# Patient Record
Sex: FEMALE | Race: WHITE
[De-identification: names, ages, dates, MRNs, and addresses within clinical notes are randomized per-mention and may not be internally consistent; named-entity substitution may affect disease eponyms.]

---

## 2019-10-04 ENCOUNTER — HOSPITAL ENCOUNTER (INPATIENT)
Dept: HOSPITAL 26 - MED | Age: 49
LOS: 1 days | Discharge: HOME | DRG: 637 | End: 2019-10-05
Attending: FAMILY MEDICINE | Admitting: FAMILY MEDICINE
Payer: COMMERCIAL

## 2019-10-04 VITALS — BODY MASS INDEX: 29.95 KG/M2 | WEIGHT: 169 LBS | HEIGHT: 63 IN

## 2019-10-04 VITALS — SYSTOLIC BLOOD PRESSURE: 119 MMHG | DIASTOLIC BLOOD PRESSURE: 66 MMHG

## 2019-10-04 VITALS — SYSTOLIC BLOOD PRESSURE: 120 MMHG | DIASTOLIC BLOOD PRESSURE: 62 MMHG

## 2019-10-04 VITALS — DIASTOLIC BLOOD PRESSURE: 69 MMHG | SYSTOLIC BLOOD PRESSURE: 125 MMHG

## 2019-10-04 VITALS — DIASTOLIC BLOOD PRESSURE: 73 MMHG | SYSTOLIC BLOOD PRESSURE: 153 MMHG

## 2019-10-04 VITALS — DIASTOLIC BLOOD PRESSURE: 69 MMHG | SYSTOLIC BLOOD PRESSURE: 116 MMHG

## 2019-10-04 DIAGNOSIS — Z79.4: ICD-10-CM

## 2019-10-04 DIAGNOSIS — G92: ICD-10-CM

## 2019-10-04 DIAGNOSIS — I10: ICD-10-CM

## 2019-10-04 DIAGNOSIS — T68.XXXA: ICD-10-CM

## 2019-10-04 DIAGNOSIS — E78.00: ICD-10-CM
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
## 2019-10-04 DIAGNOSIS — E43: ICD-10-CM

## 2019-10-04 DIAGNOSIS — Z83.3: ICD-10-CM

## 2019-10-04 DIAGNOSIS — E78.5: ICD-10-CM

## 2019-10-04 DIAGNOSIS — E11.649: Primary | ICD-10-CM

## 2019-10-04 LAB
ALBUMIN FLD-MCNC: 2.8 G/DL (ref 3.4–5)
ALBUMIN FLD-MCNC: 3.4 G/DL (ref 3.4–5)
AMYLASE SERPL-CCNC: 86 U/L (ref 25–115)
ANION GAP SERPL CALCULATED.3IONS-SCNC: 16.2 MMOL/L (ref 8–16)
APPEARANCE UR: CLEAR
AST SERPL-CCNC: 17 U/L (ref 15–37)
BARBITURATES UR QL SCN: (no result) NG/ML
BASOPHILS # BLD AUTO: 0 K/UL (ref 0–0.22)
BASOPHILS NFR BLD AUTO: 0.4 % (ref 0–2)
BENZODIAZ UR QL SCN: (no result) NG/ML
BILIRUB SERPL-MCNC: 0.2 MG/DL (ref 0–1)
BILIRUB UR QL STRIP: NEGATIVE
BUN SERPL-MCNC: 29 MG/DL (ref 7–18)
BZE UR QL SCN: (no result) NG/ML
CANNABINOIDS UR QL SCN: (no result) NG/ML
CHLORIDE SERPL-SCNC: 106 MMOL/L (ref 98–107)
CHOLEST/HDLC SERPL: 4.2 {RATIO} (ref 1–4.5)
CO2 SERPL-SCNC: 22.9 MMOL/L (ref 21–32)
COLOR UR: YELLOW
CREAT SERPL-MCNC: 1.1 MG/DL (ref 0.6–1.3)
EOSINOPHIL # BLD AUTO: 0.1 K/UL (ref 0–0.4)
EOSINOPHIL NFR BLD AUTO: 1.8 % (ref 0–4)
ERYTHROCYTE [DISTWIDTH] IN BLOOD BY AUTOMATED COUNT: 14 % (ref 11.6–13.7)
FINE GRAN CASTS URNS QL MICRO: (no result) /LPF
GFR SERPL CREATININE-BSD FRML MDRD: 68 ML/MIN (ref 90–?)
GLUCOSE SERPL-MCNC: 168 MG/DL (ref 74–106)
GLUCOSE UR STRIP-MCNC: (no result) MG/DL
HCT VFR BLD AUTO: 39 % (ref 36–48)
HDLC SERPL-MCNC: 37 MG/DL (ref 40–60)
HGB BLD-MCNC: 12.9 G/DL (ref 12–16)
HGB UR QL STRIP: NEGATIVE
HYALINE CASTS URNS QL MICRO: (no result) /LPF
LDH SERPL-CCNC: 124 U/L (ref 81–234)
LDLC SERPL CALC-MCNC: 111 MG/DL (ref 60–100)
LEUKOCYTE ESTERASE UR QL STRIP: NEGATIVE
LIPASE SERPL-CCNC: 281 U/L (ref 73–393)
LYMPHOCYTES # BLD AUTO: 1.4 K/UL (ref 2.5–16.5)
LYMPHOCYTES NFR BLD AUTO: 26.9 % (ref 20.5–51.1)
MAGNESIUM SERPL-MCNC: 1.5 MG/DL (ref 1.8–2.4)
MCH RBC QN AUTO: 32 PG (ref 27–31)
MCHC RBC AUTO-ENTMCNC: 33 G/DL (ref 33–37)
MCV RBC AUTO: 95.7 FL (ref 80–94)
MONOCYTES # BLD AUTO: 0.3 K/UL (ref 0.8–1)
MONOCYTES NFR BLD AUTO: 6.2 % (ref 1.7–9.3)
NEUTROPHILS # BLD AUTO: 3.5 K/UL (ref 1.8–7.7)
NEUTROPHILS NFR BLD AUTO: 64.7 % (ref 42.2–75.2)
NITRITE UR QL STRIP: NEGATIVE
OPIATES UR QL SCN: (no result) NG/ML
PCP UR QL SCN: (no result) NG/ML
PH UR STRIP: 5.5 [PH] (ref 5–9)
PHOSPHATE SERPL-MCNC: 4.2 MG/DL (ref 2.5–4.9)
PLATELET # BLD AUTO: 295 K/UL (ref 140–450)
POTASSIUM SERPL-SCNC: 4.1 MMOL/L (ref 3.5–5.1)
PROTHROMBIN TIME: 9.9 SECS (ref 10.8–13.4)
RBC # BLD AUTO: 4.07 MIL/UL (ref 4.2–5.4)
RBC #/AREA URNS HPF: (no result) /HPF (ref 0–5)
SODIUM SERPL-SCNC: 141 MMOL/L (ref 136–145)
T4 FREE SERPL-MCNC: 0.96 NG/DL (ref 0.76–1.46)
TRIGL SERPL-MCNC: 44 MG/DL (ref 30–150)
TSH SERPL DL<=0.05 MIU/L-ACNC: 1.3 UIU/ML (ref 0.34–3.74)
WBC # BLD AUTO: 5.4 K/UL (ref 4.8–10.8)
WBC,URINE: (no result) /HPF (ref 0–5)

## 2019-10-04 PROCEDURE — G0482 DRUG TEST DEF 15-21 CLASSES: HCPCS

## 2019-10-04 RX ADMIN — SODIUM CHLORIDE SCH MLS/HR: 9 INJECTION, SOLUTION INTRAVENOUS at 23:49

## 2019-10-04 RX ADMIN — Medication SCH DEV: at 13:30

## 2019-10-04 RX ADMIN — DEXTROSE AND SODIUM CHLORIDE SCH MLS/HR: 5; .9 INJECTION, SOLUTION INTRAVENOUS at 20:49

## 2019-10-04 RX ADMIN — SODIUM CHLORIDE SCH MLS/HR: 9 INJECTION, SOLUTION INTRAVENOUS at 20:14

## 2019-10-04 RX ADMIN — INSULIN LISPRO PRN UNITS: 100 INJECTION, SOLUTION INTRAVENOUS; SUBCUTANEOUS at 20:42

## 2019-10-04 RX ADMIN — Medication SCH DEV: at 20:25

## 2019-10-04 RX ADMIN — INSULIN LISPRO PRN UNITS: 100 INJECTION, SOLUTION INTRAVENOUS; SUBCUTANEOUS at 16:21

## 2019-10-04 RX ADMIN — DEXTROSE AND SODIUM CHLORIDE SCH MLS/HR: 5; .9 INJECTION, SOLUTION INTRAVENOUS at 13:32

## 2019-10-04 RX ADMIN — Medication SCH DEV: at 16:16

## 2019-10-04 NOTE — NUR
-------------------------------------------------------------------------------

            *** Note undone in ED - 10/04/19 at 1229 by [email or web address] ***            

-------------------------------------------------------------------------------

PER ICU RN, WE CAN'T TAKE PT TO ICU UNTIL CHARGE RN IS BACK FROM HER CLASS

## 2019-10-04 NOTE — NUR
PT STOOD UP AND WAS ABLE TO GET INTO WHEELCHAIR TO GO TO RESTROOM. PT REPORTS 
FEELING "MUCH BETTER" AT THIS TIME. PT PROVIDED URINE SAMPLE

## 2019-10-04 NOTE — NUR
PATIENT HAS BEEN SCREENED AND CATEGORIZED AS HIGH NUTRITION RISK. PATIENT WILL BE SEEN 
WITHIN 1-2 DAYS OF ADMISSION.



10/05/19-10/06/19



MARIS [address]

## 2019-10-04 NOTE — NUR
SPOKE WITH DR. MARIN REGARDING PATIENT'S HIGH BGL .  DOCTOR ORDERED ME TO D/C THE 
D5NS IVF AND REPLACE IT WITH NS AT 80ML/H.  WILL CARRY OUT ORDERS AS PRESCRIBED

## 2019-10-04 NOTE — NUR
RECEIVED PT FROM ICU NURSE, SUMMER. PT CAME IN WHEELCHAIR AND AMBULATE TO [facility] BED. NO S/S 
SOB NOTED ON ROOM AIR. IV SITE ON RIGHT HAND 20G, INTACT, PATENT, AND ASYMPTOMATIC. HANG NS 
1,000ML @ 80MLS/HR AS MD ORDERED. SKIN INTACT, WARM AND DRY TO TOUCH. BOARD UPDATED, POC 
DISCUSSED WITH PT. PT VERBALIZED UNDERSTANDING. BED IN LOW POSITION, CALL LIGHT WITHIN 
REACH.

## 2019-10-04 NOTE — NUR
PT DONE WITH DINNER, %. USED BEDSIDE COMMODE. VOIDED 500 ML CLEAR YELLOW URINE. 
SITTING UP ON BEDSIDE CHAIR WATCHING TV AT THIS TIME. TEMP 98.3F. VSS. NO S/SX OF DISTRESS 
AT THIS TIME. SAFETY MEASURES IN PLACE. WILL CONTINUE TO MONITOR.

## 2019-10-04 NOTE — NUR
DINNER TRAY PROVIDED. PT ABLE TO EAT INDEPENDENTLY. NEEDS WELL ATTENDED. VSS. NO DISTRESS 
NOTED. SAFETY PRECAUTIONS IN PLACE.

## 2019-10-04 NOTE — NUR
PT ADMITTED TO ICU FROM ER, ARRIVED VIA GURNEY X 2 ASSISTS. RECEIVED BEDSIDE REPORT FROM 
CONRAD BELTRAN. PT IS AAOX4. ABLE TO MAKE NEEDS KNOWN AND FOLLOWS COMMANDS. TEMP 97.7F. DENIES 
PAIN. NORMAL SINUS RHYTHM ON MONITOR. S1 +S2 HEARD. PT IS ON ROOM AIR, SPO2 100%. NO SOB OR 
RESPIRATORY DISTRESS NOTED. LUNGS SOUND CLEAR THROUGHOUT. BREATHING EVEN AND UNLABORED. 
ABDOMEN SOFT, ROUND, NONTENDER W/ ACTIVE BOWEL SOUNDS X 4 QUADRANTS. PERIPHERAL IV G20 TO 
RIGHT HAND ASYMPTOMATIC, PATENT AND INTACT, RUNNING IV FLUID D5NS  ML/HR. PT IS 
CONTINENT BOWEL AND BLADDER. NO BLADDER DISTENTION NOTED. SKIN IS INTACT, MOIST ON UPPER 
BODY BUT WARM TO TOUCH. PULSES PALPABLE TO ALL EXTREMITIES, CAP REFILL < 2 SEC. SCDS IN 
PLACE FOR VTE PROPHYLAXIS. HOB AT 30 DEGREES, BED IN LOWEST POSITION LOCKED. SIDE RAILS UP 
X2. CALL LIGHT WITHIN REACH. NO SIGNS OF ACUTE DISTRESS NOTED AT THIS TIME. WILL CONTINUE TO 
MONITOR.

## 2019-10-04 NOTE — NUR
BIBA AFTER BEING FOUND NON RESPONSIVE BY DAUGHTER THIS MORNING. ON SCENE, FSBS 
IN 30's, AMP OF D50 GIVEN AND D10 FLUIDS RAN PRIOR TO ARRIVAL TO ED. UPON 
ARRIVAL, FSBS 148, PT IS AWAKE AND ALERT, SLIGHTLY CONFUSED/SLOW TO RESPOND. 
AFTER EXPLAINING WHAT HAPPENED AND WHERE SHE IS, PT WAS ABLE TO COMPREHEND 
SITUATION AND BECAME A&O X3 (NAME, PLACE, BDAY). PT IS C/O L CALF PAIN, DOESNT 
KNOW IF SHE INJURED HERSELF AT ALL. PT IS HYPOTHERMIC UPON ARRIVAL, 89.9 RECTAL 
TEMP. ALL WET CLOTHING REMOVED AND BEAR HUGGER AND MULTIPLE BLANKETS APPLIED IN 
ATTEMPT TO REWARM PT. BED IN LOW POSITION, SIDE RAIL UP X2 FOR PT SAFTE. PT 
PLACED ON BEDSIDE CARDIAC MONITOR AT THIS TIME. VSS

## 2019-10-04 NOTE — NUR
PER IRENE, ICU RN - CHARGE NURSE IS NOT BACK YET, WE CANNOT TAKE PT TO ICU AT 
THIS TIME. IRENE WILL SEND PT TRAY OVER HERE TO ER.

## 2019-10-04 NOTE — NUR
RECEIVED CHANGE OF SHIFT REPORT FROM AM NURSE.  PATIENT IS ALERT, AWAKE, AND ORIENTED.  FULL 
CODE AND NO KNOWN ALLERGIES.  SKIN IS WARM, DRY AND INTACT WITH NO EDEMA AND NO FEVER.  
RESPIRATIONS ARE UNLABORED, REGULAR, AND LUNG SOUNDS ARE CLEAR THROUGHOUT WITH NO COUGH 
PRESENT.  NSR ON CARDIAC MONITOR WITH S1 AND S2 SOUND AUSCULTATED. PULSES IN UPPER AND LOWER 
EXTREMITIES ARE 2+ BILATERALLY AND MOTOR FUNCTION IS NORMAL IN UPPER AND LOWER EXTREMITIES. 
PERRLA IS PRESENT WITH PUPILS 3MM BILATERALLY.  PATIENT USES BEDSIDE COMMODE, WITH NO URINE 
OR FECES PRESENT AT THIS TIME.  LAST BOWEL MOVEMENT WAS 10/03/19.  RIGHT HAND 20 GAUGE 
PATENT IV IS PRESENT WITH SITE/DRESSING DRY AND INTACT.  D5NS IS CURRENTLY INFUSING AT THE 
PRESCRIBED 100ML/H.  PATIENT DOES NOT NEED ANYTHING AT THIS TIME, AND CALL LIGHT LEFT WITHIN 
REACH AS PATIENT IS SITTING COMFORTABLY IN THE CHAIR WATCHING TV.

## 2019-10-04 NOTE — NUR
Transferred patient in wheelchair to Telemetry unit per orders.  Gave transfer report to 
Telemetry Nurse Benjamín.  Patient is currently awake, alert, and oriented x4.  Patient has no 
complaints at this time and vital signs are stable and within normal limits.

## 2019-10-04 NOTE — NUR
PT AWAKE AND CONVERSATING WITH HER SON AT BEDSIDE, STATES SHE IS STILL FEELING 
BETTER AT THIS TIME.

## 2019-10-04 NOTE — NUR
Patient will be admitted to care of DR. HARRIS. Admited to ICU.  Will go to room 
3. Belongings list completed.  Report to CONRAD GODOY.

## 2019-10-04 NOTE — NUR
-------------------------------------------------------------------------------

            *** Note undone in EDM - 10/04/19 at 0956 by [email or web address] ***            

-------------------------------------------------------------------------------

BIBA AFTER BEING FOUND NON RESPONSIVE BY DAUGHTER THIS MORNING. ON SCENE, FSBS 
IN 30's, AMP OF D50 GIVEN AND D10 FLUIDS RAN PRIOR TO ARRIVAL TO ED. UPON 
ARRIVAL, FSBS 148, PT IS AWAKE AND ALERT, SLIGHTLY CONFUSED/SLOW TO RESPOND. 
AFTER EXPLAINING WHAT HAPPENED AND WHERE SHE IS, PT WAS ABLE TO COMPREHEND 
SITUATION AND BECAME A&O X3 (NAME, PLACE, BDAY). PT IS C/O L CALF PAIN, DOESNT 
KNOW IF SHE INJURED HERSELF AT ALL. PT IS HYPOTHERMIC UPON ARRIVAL, 98.9 RECTAL 
TEMP. ALL WET CLOTHING REMOVED AND BEAR HUGGER AND MULTIPLE BLANKETS APPLIED IN 
ATTEMPT TO REWARM PT. BED IN LOW POSITION, SIDE RAIL UP X2 FOR PT SAFTE. PT 
PLACED ON BEDSIDE CARDIAC MONITOR AT THIS TIME. VSS

## 2019-10-04 NOTE — NUR
FAMILY AT BEDSIDE. PT IS AAOX4. VSS. TEMP 98.6 F PER TEMPORAL ARTERY SCAN. DENIES PAIN. NO 
SIGNS OF DISTRESS NOTED AT THIS TIME. WILL CONTINUE TO MONITOR.

## 2019-10-05 VITALS — SYSTOLIC BLOOD PRESSURE: 125 MMHG | DIASTOLIC BLOOD PRESSURE: 69 MMHG

## 2019-10-05 VITALS — SYSTOLIC BLOOD PRESSURE: 130 MMHG | DIASTOLIC BLOOD PRESSURE: 69 MMHG

## 2019-10-05 VITALS — SYSTOLIC BLOOD PRESSURE: 133 MMHG | DIASTOLIC BLOOD PRESSURE: 73 MMHG

## 2019-10-05 LAB
ANION GAP SERPL CALCULATED.3IONS-SCNC: 12.4 MMOL/L (ref 8–16)
BASOPHILS # BLD AUTO: 0 K/UL (ref 0–0.22)
BASOPHILS NFR BLD AUTO: 0.3 % (ref 0–2)
BUN SERPL-MCNC: 16 MG/DL (ref 7–18)
CHLORIDE SERPL-SCNC: 110 MMOL/L (ref 98–107)
CO2 SERPL-SCNC: 23.9 MMOL/L (ref 21–32)
CREAT SERPL-MCNC: 0.8 MG/DL (ref 0.6–1.3)
EOSINOPHIL # BLD AUTO: 0.2 K/UL (ref 0–0.4)
EOSINOPHIL NFR BLD AUTO: 3.2 % (ref 0–4)
ERYTHROCYTE [DISTWIDTH] IN BLOOD BY AUTOMATED COUNT: 14 % (ref 11.6–13.7)
GFR SERPL CREATININE-BSD FRML MDRD: 98 ML/MIN (ref 90–?)
GLUCOSE SERPL-MCNC: 104 MG/DL (ref 74–106)
HCT VFR BLD AUTO: 34.4 % (ref 36–48)
HGB BLD-MCNC: 11.2 G/DL (ref 12–16)
LYMPHOCYTES # BLD AUTO: 2.5 K/UL (ref 2.5–16.5)
LYMPHOCYTES NFR BLD AUTO: 35.7 % (ref 20.5–51.1)
MAGNESIUM SERPL-MCNC: 1.8 MG/DL (ref 1.8–2.4)
MCH RBC QN AUTO: 31 PG (ref 27–31)
MCHC RBC AUTO-ENTMCNC: 33 G/DL (ref 33–37)
MCV RBC AUTO: 95.9 FL (ref 80–94)
MONOCYTES # BLD AUTO: 0.4 K/UL (ref 0.8–1)
MONOCYTES NFR BLD AUTO: 6.5 % (ref 1.7–9.3)
NEUTROPHILS # BLD AUTO: 3.8 K/UL (ref 1.8–7.7)
NEUTROPHILS NFR BLD AUTO: 54.3 % (ref 42.2–75.2)
PHOSPHATE SERPL-MCNC: 3.8 MG/DL (ref 2.5–4.9)
PLATELET # BLD AUTO: 285 K/UL (ref 140–450)
POTASSIUM SERPL-SCNC: 4.3 MMOL/L (ref 3.5–5.1)
RBC # BLD AUTO: 3.58 MIL/UL (ref 4.2–5.4)
SODIUM SERPL-SCNC: 142 MMOL/L (ref 136–145)
T3RU NFR SERPL: 35 % (ref 24–39)
T4 SERPL-MCNC: 6.8 UG/DL (ref 4.5–12)
WBC # BLD AUTO: 6.9 K/UL (ref 4.8–10.8)

## 2019-10-05 RX ADMIN — Medication SCH DEV: at 04:18

## 2019-10-05 RX ADMIN — Medication SCH DEV: at 12:09

## 2019-10-05 RX ADMIN — DEXTROSE AND SODIUM CHLORIDE SCH MLS/HR: 5; .9 INJECTION, SOLUTION INTRAVENOUS at 06:49

## 2019-10-05 RX ADMIN — INSULIN LISPRO PRN UNITS: 100 INJECTION, SOLUTION INTRAVENOUS; SUBCUTANEOUS at 12:11

## 2019-10-05 RX ADMIN — Medication SCH DEV: at 08:36

## 2019-10-05 NOTE — NUR
UNABLE TO SCAN FLU VACCINE AND CALLED PHARMACY, AND VIPIN ENTER BAR CODE, VERIFIED WITH 
CHARGE RN; INFLUENZA VACCINNE AFLURIA QUADRIVALENT NDC [id number], LOT [phone] [id number], EXP 
JUN 30, 2020. ADMINISTERED FLU VACCINE ON L DELTOID AS PER MD ORDER,PATIENT TOLERATED WELL. 
FLU VACCINE EDUCATION PROVIDED TO PATIENT AND PATIENT VERBALIZED UNDERSTANDING.

## 2019-10-05 NOTE — NUR
DISCHARGE INSTRUCTION PROVIDED TO PATIENT AT BEDSIDE. EDUCATED PATIENT ON MD FOLLOW UP, SEEK 
MEDICAL HELP IN CASE OF EMERGENCY, DISEASE MANAGEMENT, MEDICATION REGIMEN, SIDE EFFECTS, AND 
DIET. ANSWERED ALL PATIENT'S QUESTION AND PATIENT VERBALIZED UNDERSTANDING. DC IV AND 
CANNULA INTACT AND NO BLEEDING AT IV SITE. REMOVED ALL ARM BANDS. PATIENT CHECKED ALL THE 
CABINETS AND TOOK ALL HER BELONGINGS. PATIENT IS GOING TO DISCHARGE HOME AND ACCOMPANY BY 
DAUGHTER IN [place]. CNA WHEELCHAIR PATIENT TO THE FRONT LOBBY, PATIENT IS GOING TO DC AT 
THIS TIME IN STABLE CONDITION.

## 2019-10-05 NOTE — NUR
PT SLEEPING IN BED. RESPIRATORY EVEN AND UNLABORED. NO ACUTE DISTRESS NOTED. WILL CONTINUE 
TO MONITOR.

## 2019-10-05 NOTE — NUR
ADMINISTERED MED AS PER MD ORDER, PATIENT TOLERATED WELL. MED EDUCATION PROVIDED TO PATIENT 
AND PATIENT VERBALIZED UNDERSTANDING. PATIENT AWAKE AND RESTING ON BED AT THIS TIME. DENIED 
PAIN, SOB, AND DIZZINESS. NO SIGNS OF DISTRESS NOTED. TELE MONITOR ATTACHED. SAFETY MEASURES 
IN PLACE. FALL RISK PROTOCOL IN PLACE AND BED ALARM ACTIVATED. BED IN LOW POSITION AND CALL 
LIGHT WITHIN REACH. INSTRUCTED PATIENT TO USE THE CALL LIGHT FOR ANY ASSISTANCE AND PATIENT 
SAID OK.

## 2019-10-05 NOTE — NUR
CHECKED BLOOD GLUCOSE AND RECEIVED 100, NO COVER NEEDED. PATIENT AWAKE AND USING HER PHONE. 
DENIED PAIN, SOB, AND DIZZINESS. NO SIGNS OF DISTRESS NOTED. TELE MONITOR ATTACHED. SAFETY 
MEASURES IN PLACE. BED IN LOW POSITION AND CALL LIGHT WITHIN REACH. INSTRUCTED PATIENT TO 
USE THE CALL LIGHT FOR ANY ASSISTANCE AND PATIENT WAS AWARE.

## 2019-10-05 NOTE — NUR
VITAL SIGNS TAKEN AND BLOOD GLUCOSE CHECKED AND RECEIVED 219. WILL COVER WITH INSULIN. PER 
PATIENT, HER FAMILY WILL COME TO PICK HER UP AFTER LUNCH AROUND NOON. PATIENT IS AWAKE AND 
RESTING ON BED AT THIS TIME. DENIED PAIN, SOB, AND DIZZINESS. NO SIGNS OF DISTRESS NOTED. 
TELE MONITOR ATTACHED. SAFETY MEASURES IN PLACE. BED IN LOW POSITION AND CALL LIGHT WITHIN 
REACH. INSTRUCTED PATIENT TO USE THE CALL LIGHT FOR ANY ASSISTANCE AND PATIENT WAS AWARE.

## 2019-10-05 NOTE — NUR
INFORMED PATIENT THAT SHE WILL BE DISCHARGE FROM HOSPITAL TODAY AND PATIENT WAS AWARE. 
PATIENT SAID " LET'S ME CALL AND FIND OUT WHAT TIME THEY WILL BE ABLE TO PICK ME UP." 
PATIENT IS LOOKING AT HER PHONE AT THIS TIME. NO SIGNS OF DISTRESS NOTED. TELE MONITOR 
ATTACHED. SAFETY MEASURES IN PLACE. BED IN LOW POSITION AND CALL LIGHT WITHIN REACH. 
INSTRUCTED PATIENT TO USE THE CALL LIGHT FOR ANY ASSISTANCE AND PATIENT WAS AWARE.

## 2019-10-05 NOTE — NUR
PT SLEEPING IN BED. BREATHING EVEN AND UNLABORED. NO ACUTE DISTRESS NOTED. WILL CONTINUE TO 
MONITOR.

## 2019-10-05 NOTE — NUR
ADMINISTERED 4 UNIT HUMALOG FOR BLOOD GLUCOSE 219, PATIENT TOLERATED WELL. PATIENT AWAKE AND 
TALKING TO DAUGHTER IN [place] BY BEDSIDE. PATIENT DENIED PAIN, SOB, AND DIZZINESS. NO 
SIGNS OF DISTRESS NOTED. TELE MONITOR ATTACHED. SAFETY MEASURES IN PLACE. BED IN LOW 
POSITION AND CALL LIGHT WITHIN REACH. INSTRUCTED PATIENT TO USE THE CALL LIGHT FOR ANY 
ASSISTANCE AND PATIENT WAS AWARE.

## 2019-10-05 NOTE — NUR
RECEIVED BEDSIDE REPORT FORM NIGHT SHIFT NURSE. PATIENT AWAKE AND RESTING ON BED COMFORTABLY 
AT THIS TIME. PATIENT IS AAOX4,ABLE TO FOLLOW COMMAND AND MAKE NEEDS KNOWN. RESPIRATION EVEN 
AND UNLABORED ON RA. DENIED PAIN, SOB, AND DIZZINESS. NO SIGNS OF DISTRESS NOTED. IV ON R 
HAND 20G, CLEAN AND INTACT, INFUSING AS PER MD ORDER. SKIN CLEAN AND DRY.  PATIENT IS ABLE 
TO AMBULATE WITH STANDBY ASSIST. PATIENT IS CONTINENT AND BEDSIDE COMMODE BY BEDSIDE. 
DISCUSSED PLAN OF CARE WITH PATIENT AND PATIENT SAID OK. TELE MONITOR ATTACHED. SAFETY 
MEASURES IN PLACE. BED IN LOW POSITION AND CALL LIGHT WITHIN REACH. INSTRUCTED PATIENT TO 
USE THE CALL LIGHT FOR ANY ASSISTANCE AND PATIENT WAS AWARE.

## 2019-10-05 NOTE — NUR
PATIENT IS EATING LUNCH ON BED AT THIS TIME. DAUGHTER IN [place] IS BY BEDSIDE. NO SIGNS OF 
DISTRESS NOTED. SAFETY MEASURES IN PLACE. TELE MONITOR ATTACHED. BED IN LOW POSITION AND 
CALL LIGHT WITHIN REACH.

## 2020-01-09 NOTE — NUR
-------------------------------------------------------------------------------

            *** Note undone in ED - 10/04/19 at 0955 by [email or web address] ***            

-------------------------------------------------------------------------------

bear hugger applied for rectal temp 98.9 English

## 2024-09-24 ENCOUNTER — HOSPITAL ENCOUNTER (EMERGENCY)
Dept: HOSPITAL 26 - MED | Age: 54
Discharge: HOME | End: 2024-09-24
Payer: COMMERCIAL

## 2024-09-24 VITALS
DIASTOLIC BLOOD PRESSURE: 71 MMHG | RESPIRATION RATE: 17 BRPM | TEMPERATURE: 97.7 F | OXYGEN SATURATION: 100 % | HEART RATE: 66 BPM | SYSTOLIC BLOOD PRESSURE: 127 MMHG

## 2024-09-24 VITALS
RESPIRATION RATE: 16 BRPM | HEART RATE: 73 BPM | SYSTOLIC BLOOD PRESSURE: 107 MMHG | OXYGEN SATURATION: 100 % | DIASTOLIC BLOOD PRESSURE: 59 MMHG | TEMPERATURE: 97.8 F

## 2024-09-24 VITALS — WEIGHT: 140 LBS | HEIGHT: 60 IN | BODY MASS INDEX: 27.48 KG/M2

## 2024-09-24 VITALS — OXYGEN SATURATION: 100 %

## 2024-09-24 DIAGNOSIS — I10: ICD-10-CM

## 2024-09-24 DIAGNOSIS — Z79.4: ICD-10-CM

## 2024-09-24 DIAGNOSIS — Z79.899: ICD-10-CM

## 2024-09-24 DIAGNOSIS — E11.649: ICD-10-CM

## 2024-09-24 DIAGNOSIS — E11.9: Primary | ICD-10-CM

## 2024-09-24 LAB
ALBUMIN FLD-MCNC: 3.6 G/DL (ref 3.4–5)
ALP SERPL-CCNC: 83 U/L (ref 50–136)
ALT SERPL-CCNC: 21 U/L (ref 12–78)
ANION GAP SERPL CALCULATED.3IONS-SCNC: 12.7 MMOL/L (ref 8–16)
AST SERPL-CCNC: 17 U/L (ref 15–37)
BASOPHILS # BLD AUTO: 0.1 K/UL (ref 0–0.22)
BASOPHILS NFR BLD AUTO: 0.8 % (ref 0–2)
BILIRUB DIRECT SERPL-MCNC: 0 MG/DL (ref 0–0.3)
BILIRUB SERPL-MCNC: 0.2 MG/DL (ref 0–1)
BUN SERPL-MCNC: 24 MG/DL (ref 7–18)
CALCIUM SPEC-MCNC: 9.5 MG/DL (ref 8.5–10.1)
CHLORIDE SERPL-SCNC: 106 MMOL/L (ref 98–107)
CO2 SERPL-SCNC: 26.9 MMOL/L (ref 21–32)
CREAT SERPL-MCNC: 1 MG/DL (ref 0.6–1.3)
EOSINOPHIL # BLD AUTO: 0.4 K/UL (ref 0–0.4)
EOSINOPHIL NFR BLD AUTO: 3.4 % (ref 0–4)
ERYTHROCYTE [DISTWIDTH] IN BLOOD BY AUTOMATED COUNT: 14.4 % (ref 11.6–13.7)
GFR SERPL CREATININE-BSD FRML MDRD: 74 ML/MIN (ref 90–?)
GLUCOSE SERPL-MCNC: 36 MG/DL (ref 74–106)
HCT VFR BLD AUTO: 42.4 % (ref 36–48)
HGB BLD-MCNC: 14.2 G/DL (ref 12–16)
LIPASE SERPL-CCNC: 59 U/L (ref 16–77)
LYMPHOCYTES # BLD AUTO: 2.7 K/UL (ref 2.5–16.5)
LYMPHOCYTES NFR BLD AUTO: 25.1 % (ref 20.5–51.1)
MCH RBC QN AUTO: 32 PG (ref 27–31)
MCHC RBC AUTO-ENTMCNC: 33 G/DL (ref 33–37)
MCV RBC AUTO: 95.1 FL (ref 80–94)
MONOCYTES # BLD AUTO: 0.6 K/UL (ref 0.8–1)
MONOCYTES NFR BLD AUTO: 5.6 % (ref 1.7–9.3)
NEUTROPHILS # BLD AUTO: 6.9 K/UL (ref 1.8–7.7)
NEUTROPHILS NFR BLD AUTO: 65.1 % (ref 42.2–75.2)
PLATELET # BLD AUTO: 314 K/UL (ref 140–450)
POTASSIUM SERPL-SCNC: 3.6 MMOL/L (ref 3.5–5.1)
PROT SERPL-MCNC: 7.6 G/DL (ref 6.4–8.2)
RBC # BLD AUTO: 4.46 MIL/UL (ref 4.2–5.4)
SODIUM SERPL-SCNC: 142 MMOL/L (ref 136–145)
WBC # BLD AUTO: 10.6 K/UL (ref 4.8–10.8)

## 2024-09-24 RX ADMIN — DEXTROSE MONOHYDRATE ONE ML: 25 INJECTION, SOLUTION INTRAVENOUS at 06:52
